# Patient Record
Sex: MALE | Race: WHITE | NOT HISPANIC OR LATINO | Employment: FULL TIME | URBAN - METROPOLITAN AREA
[De-identification: names, ages, dates, MRNs, and addresses within clinical notes are randomized per-mention and may not be internally consistent; named-entity substitution may affect disease eponyms.]

---

## 2017-01-31 ENCOUNTER — COMMUNICATION - HEALTHEAST (OUTPATIENT)
Dept: FAMILY MEDICINE | Facility: CLINIC | Age: 37
End: 2017-01-31

## 2017-02-07 ENCOUNTER — OFFICE VISIT - HEALTHEAST (OUTPATIENT)
Dept: INTERNAL MEDICINE | Facility: CLINIC | Age: 37
End: 2017-02-07

## 2017-02-07 DIAGNOSIS — R74.01 NONSPECIFIC ELEVATION OF LEVELS OF TRANSAMINASE OR LACTIC ACID DEHYDROGENASE (LDH): ICD-10-CM

## 2017-02-07 DIAGNOSIS — Z00.00 ROUTINE GENERAL MEDICAL EXAMINATION AT A HEALTH CARE FACILITY: ICD-10-CM

## 2017-02-07 DIAGNOSIS — R74.02 NONSPECIFIC ELEVATION OF LEVELS OF TRANSAMINASE OR LACTIC ACID DEHYDROGENASE (LDH): ICD-10-CM

## 2017-02-07 DIAGNOSIS — M10.9 GOUT, UNSPECIFIED: ICD-10-CM

## 2017-02-07 LAB
CHOLEST SERPL-MCNC: 169 MG/DL
FASTING STATUS PATIENT QL REPORTED: ABNORMAL
HDLC SERPL-MCNC: 42 MG/DL
LDLC SERPL CALC-MCNC: 85 MG/DL
TRIGL SERPL-MCNC: 208 MG/DL

## 2017-02-07 ASSESSMENT — MIFFLIN-ST. JEOR: SCORE: 2204.46

## 2017-02-13 ENCOUNTER — COMMUNICATION - HEALTHEAST (OUTPATIENT)
Dept: INTERNAL MEDICINE | Facility: CLINIC | Age: 37
End: 2017-02-13

## 2018-03-22 ENCOUNTER — COMMUNICATION - HEALTHEAST (OUTPATIENT)
Dept: INTERNAL MEDICINE | Facility: CLINIC | Age: 38
End: 2018-03-22

## 2018-03-22 DIAGNOSIS — Z30.09 VASECTOMY EVALUATION: ICD-10-CM

## 2018-05-31 ENCOUNTER — RECORDS - HEALTHEAST (OUTPATIENT)
Dept: ADMINISTRATIVE | Facility: OTHER | Age: 38
End: 2018-05-31

## 2018-07-03 ENCOUNTER — COMMUNICATION - HEALTHEAST (OUTPATIENT)
Dept: TELEHEALTH | Facility: CLINIC | Age: 38
End: 2018-07-03

## 2018-07-03 ENCOUNTER — HOSPITAL ENCOUNTER (OUTPATIENT)
Dept: ULTRASOUND IMAGING | Facility: CLINIC | Age: 38
Discharge: HOME | End: 2018-07-03
Attending: UROLOGY

## 2018-07-03 ENCOUNTER — RECORDS - HEALTHEAST (OUTPATIENT)
Dept: ADMINISTRATIVE | Facility: OTHER | Age: 38
End: 2018-07-03

## 2018-07-03 DIAGNOSIS — N45.3 ORCHITIS AND EPIDIDYMITIS: ICD-10-CM

## 2018-08-06 ENCOUNTER — COMMUNICATION - HEALTHEAST (OUTPATIENT)
Dept: INTERNAL MEDICINE | Facility: CLINIC | Age: 38
End: 2018-08-06

## 2018-08-06 ENCOUNTER — OFFICE VISIT - HEALTHEAST (OUTPATIENT)
Dept: INTERNAL MEDICINE | Facility: CLINIC | Age: 38
End: 2018-08-06

## 2018-08-06 ENCOUNTER — RECORDS - HEALTHEAST (OUTPATIENT)
Dept: GENERAL RADIOLOGY | Facility: CLINIC | Age: 38
End: 2018-08-06

## 2018-08-06 DIAGNOSIS — M25.472 EFFUSION, LEFT ANKLE: ICD-10-CM

## 2018-08-06 DIAGNOSIS — M25.472 LEFT ANKLE SWELLING: ICD-10-CM

## 2019-02-27 ENCOUNTER — COMMUNICATION - HEALTHEAST (OUTPATIENT)
Dept: ADMINISTRATIVE | Facility: CLINIC | Age: 39
End: 2019-02-27

## 2019-02-27 ENCOUNTER — OFFICE VISIT - HEALTHEAST (OUTPATIENT)
Dept: INTERNAL MEDICINE | Facility: CLINIC | Age: 39
End: 2019-02-27

## 2019-02-27 DIAGNOSIS — M10.9 ACUTE GOUT OF LEFT FOOT, UNSPECIFIED CAUSE: ICD-10-CM

## 2019-02-27 RX ORDER — LORAZEPAM 0.5 MG
2 TABLET ORAL EVERY MORNING
Status: SHIPPED | COMMUNITY
Start: 2019-02-27 | End: 2022-04-07

## 2019-06-11 ENCOUNTER — OFFICE VISIT - HEALTHEAST (OUTPATIENT)
Dept: INTERNAL MEDICINE | Facility: CLINIC | Age: 39
End: 2019-06-11

## 2019-06-11 DIAGNOSIS — Z00.00 ROUTINE GENERAL MEDICAL EXAMINATION AT A HEALTH CARE FACILITY: ICD-10-CM

## 2019-06-11 DIAGNOSIS — L91.8 CUTANEOUS SKIN TAGS: ICD-10-CM

## 2019-06-11 DIAGNOSIS — M10.9 ACUTE GOUT OF LEFT FOOT, UNSPECIFIED CAUSE: ICD-10-CM

## 2019-06-11 LAB
CHOLEST SERPL-MCNC: 173 MG/DL
FASTING STATUS PATIENT QL REPORTED: YES
FASTING STATUS PATIENT QL REPORTED: YES
GLUCOSE BLD-MCNC: 89 MG/DL (ref 70–125)
HDLC SERPL-MCNC: 48 MG/DL
LDLC SERPL CALC-MCNC: 97 MG/DL
TRIGL SERPL-MCNC: 139 MG/DL
URATE SERPL-MCNC: 8 MG/DL (ref 3–8)

## 2019-06-11 RX ORDER — INDOMETHACIN 50 MG/1
CAPSULE ORAL
Qty: 20 CAPSULE | Refills: 0 | Status: SHIPPED | OUTPATIENT
Start: 2019-06-11 | End: 2022-04-07

## 2019-06-11 ASSESSMENT — MIFFLIN-ST. JEOR: SCORE: 2262.29

## 2019-12-20 ENCOUNTER — RECORDS - HEALTHEAST (OUTPATIENT)
Dept: ADMINISTRATIVE | Facility: OTHER | Age: 39
End: 2019-12-20

## 2021-05-29 NOTE — PROGRESS NOTES
Assessment:      Healthy male exam.      Plan:      Skin tags were removed with ethyl chloride and an iris scissors.  Check a lipid profile, glucose, and uric acid level.  Follow-up 2 years, earlier if needed.    Subjective:      Marin Jimenez is a 38 y.o. male who presents for an annual exam. The patient reports that there is not domestic violence in his life.         Immunization History   Administered Date(s) Administered     Td, adult adsorbed, PF 08/09/2018     Immunization status: up to date and documented.    No exam data present    Current Outpatient Medications   Medication Sig Dispense Refill     indomethacin (INDOCIN) 50 MG capsule Take 3 times a day for 2 days, then twice a day for 3 days, then once a day for 4 days, then stop. 20 capsule 0     sour cherry extract (TART CHERRY EXTRACT) 1,000 mg cap Take 2 capsules by mouth every morning.       No current facility-administered medications for this visit.      History reviewed. No pertinent past medical history.  Past Surgical History:   Procedure Laterality Date     INGUINAL HERNIA REPAIR Left 2004     Patient has no known allergies.  History reviewed. No pertinent family history.  Social History     Socioeconomic History     Marital status:      Spouse name: Not on file     Number of children: Not on file     Years of education: Not on file     Highest education level: Not on file   Occupational History     Not on file   Social Needs     Financial resource strain: Not on file     Food insecurity:     Worry: Not on file     Inability: Not on file     Transportation needs:     Medical: Not on file     Non-medical: Not on file   Tobacco Use     Smoking status: Never Smoker     Smokeless tobacco: Never Used   Substance and Sexual Activity     Alcohol use: Not on file     Drug use: Not on file     Sexual activity: Not on file   Lifestyle     Physical activity:     Days per week: Not on file     Minutes per session: Not on file     Stress: Not on file  "  Relationships     Social connections:     Talks on phone: Not on file     Gets together: Not on file     Attends Rastafari service: Not on file     Active member of club or organization: Not on file     Attends meetings of clubs or organizations: Not on file     Relationship status: Not on file     Intimate partner violence:     Fear of current or ex partner: Not on file     Emotionally abused: Not on file     Physically abused: Not on file     Forced sexual activity: Not on file   Other Topics Concern     Not on file   Social History Narrative     Not on file       Review of Systems  General:  Denies problem  Eyes: Denies problem  Ears/Nose/Throat: Denies problem  Cardiovascular: Denies problem  Respiratory:  Denies problem  Gastrointestinal:  Denies problem  Genitourinary: Denies problem  Musculoskeletal:  Denies problem  Skin: Denies problem  Neurologic: Denies problem  Psychiatric: Denies problem  Endocrine: Denies problem  Heme/Lymphatic: Denies problem   Allergic/Immunologic: Denies problem        Objective:     Vitals:    06/11/19 0955   BP: 124/84   Pulse: 64   Weight: (!) 267 lb (121.1 kg)   Height: 6' 6.5\" (1.994 m)     Body mass index is 30.46 kg/m .    Physical  General Appearance: Alert, cooperative, no distress, appears stated age  Head: Normocephalic, without obvious abnormality, atraumatic  Eyes: PERRL, conjunctiva/corneas clear, EOM's intact  Ears: Normal TM's and external ear canals, both ears  Nose: Nares normal, septum midline,mucosa normal, no drainage  Throat: Lips, mucosa, and tongue normal; teeth and gums normal  Neck: Supple, symmetrical, trachea midline, no adenopathy;  thyroid: not enlarged, symmetric, no tenderness/mass/nodules; no carotid bruit or JVD  Back: Symmetric, no curvature, ROM normal, no CVA tenderness  Lungs: Clear to auscultation bilaterally, respirations unlabored  Heart: Regular rate and rhythm, S1 and S2 normal, no murmur, rub, or gallop,  Abdomen: Soft, non-tender, " bowel sounds active all four quadrants,  no masses, no organomegaly  Musculoskeletal: Normal range of motion. No joint swelling or deformity.   Extremities: Extremities normal, atraumatic, no cyanosis or edema  Skin: Skin color, texture, turgor normal, no rashes or lesions  Lymph nodes: Cervical, supraclavicular, and axillary nodes normal  Neurologic: He is alert. He has normal reflexes.   Psychiatric: He has a normal mood and affect.   Skin: There is 3 skin tags in the right axilla and one skin tag in the left axilla.

## 2021-05-30 VITALS — WEIGHT: 259.5 LBS | BODY MASS INDEX: 30.64 KG/M2 | HEIGHT: 77 IN

## 2021-06-01 VITALS — WEIGHT: 282 LBS | BODY MASS INDEX: 33.44 KG/M2

## 2021-06-02 VITALS — BODY MASS INDEX: 32.37 KG/M2 | WEIGHT: 273 LBS

## 2021-06-03 VITALS — BODY MASS INDEX: 30.89 KG/M2 | HEIGHT: 78 IN | WEIGHT: 267 LBS

## 2021-06-08 NOTE — PROGRESS NOTES
Marin presents today to establish care as a new patient.  Please see the physical exam forms a and B for further details, including a complete review of systems.    ILNESSES, HOSPITALIZATIONS, AND OPERATIONS:    #1.  Clinically diagnosed gout in 2014.  He also has hyperuricemia as well.    #2.  Status post left inguinal hernia repair in 2004    Marin feels well currently and has no acute complaints.  It has been over a year since his last flare of gout.  He had a very minimally elevated ALT on his last lab draw in 2014.  He has lost about 20 pounds over the last year which I congratulated him on.  He is not on any prescription medications.  No known drug allergies.  No family history of prostate cancer or colon cancer.  He works as an , is  and has 2 children with one on the way.  He lives in [place].  He is a nonsmoker.  A complete review of systems was unremarkable.    OBJECTIVE:    In general the patient is alert, pleasant, and no acute distress.    HEENT: Pupils are equal, round, and reactive to light.  Oropharynx is clear  LYMPHATIC: No anterior or posterior lymphadenopathy  CV: S1, S2, regular rate and rhythm, no murmurs, gallops, or rubs  LUNGS: Clear to auscultation bilaterally  ABDOMEN: Soft, non-tender, non-distended  EXTREMITIES: No pedal edema bilaterally    Assessment and plan: Physical exam with the following:    #1.  Gout.  Check a uric acid level.    #2.  History of mildly elevated ALT in the past.  Check a CMP today.    #3.  Healthcare maintenance.:  Prostate cancer screening will not be due until age 50.  Check a lipid profile today.  Glucose will be based off his comp.

## 2021-06-19 NOTE — PROGRESS NOTES
Marin comes in today for evaluation of left ankle swelling.  He states that this is been present for about the last 6 months.  It had been worse, but has now improved over the last couple of months.  Fortunately he has absolutely no pain whatsoever associated with this.  He has no pain with weightbearing and no pain with ambulation.  He denies any erythema around the joint and has no other involved joints at all.  A complete review of systems is undertaken was negative.  Specifically, he is having no fevers, chest pain, shortness of breath.  No other sexual partners outside of his marriage.    Objective: Vital signs are as per the EMR.  In general the patient is alert pleasant and in no acute distress.  He appears healthy.    Left ankle.  There is a mild to moderate amount of swelling around the tibiotalar joint.  No tenderness to palpation.  Ankle range of motion is slightly limited to plantar flexion, dorsiflexion, inversion and eversion.  No erythema and no warmth to touch.    Imaging: 3 views of the left ankle were obtained today.  I see no bony abnormalities.    Assessment and plan: Left ankle swelling ×6 months.  Interestingly he is having absolutely no pain whatsoever.  I told him that the next step in workup of this would be taking a better look at the structure of his ankle with an MRI.  However, again since he is having no pain and is not significantly interfering with the quality of his life we could consider keeping eye on things for the time being.  He is going to call his insurance company to see how much an MRI would be and get back to me regarding his decision.

## 2021-06-24 NOTE — PATIENT INSTRUCTIONS - HE
Stay well-hydrated with plenty of water.  You can continue the cherry extract.    Avoid all alcohol.  Eat a low purine diet, or gout diet, which you can look up on the Internet.    You can use ice to reduce throbbing pain.    Use indomethacin to reduce pain and inflammation.  Contact clinic if the pain is not getting better within a couple of days, or recurs.  You could consider a course of steroids.    Follow-up this spring or summer with Dr. Ellis Knott for a physical exam.

## 2021-06-24 NOTE — TELEPHONE ENCOUNTER
Pt was seen by Dr Estevez this morning and was rx'd indomethacin. Pt notified that if the gout flares become more common and he wants to start on longterm gout medication to call us back to schedule follow up with Dr Strange

## 2021-06-24 NOTE — TELEPHONE ENCOUNTER
Alexadnr    Last seen 04/2014. Currently having a gout flare up on left toe. Has not had a flare up since then so that is why his he has not returned. Wondering if he can get in today for a visit or if an RX can be sent in.    Notified that since it has been over 3 years, he would be considered a new patient. He can also ask his PCP. For now, It will be up to the provider.     Patient can be reached @ [phone].

## 2021-06-24 NOTE — PROGRESS NOTES
[facility] clinic Follow Up Note    Marin Jimenez   38 y.o. male    Date of Visit: 2/27/2019    Chief Complaint   Patient presents with     Gout     Subjective  Marin is a Ellis Knott patient who is coming in for acute gout in his left MP joint 3 days ago.    Patient does have a past history of gout but last episode was 3 years ago.  Originally diagnosed in 2014.    2014 he did have a high uric acid level of 9.1.  2017 uric acid level is normal with a creatinine 1.1 and normal liver tests and blood sugar.    Patient was down in [place] one week ago and was overeating and drinking alcohol.    This past weekend patient had more beer than usual.    Sunday, 3 days ago, patient had acute redness and swelling and pain in his right MT joint.  No other joints affected.    No fevers.  Similar to previous gout.  He is staying well-hydrated, abstaining from alcohol and using some cherry juice extract.    He did take 3 ibuprofen this morning which helped a little bit.    He denies a past history of ulcer or bleeding issues.    His blood pressure has been normal.    He is  with 2 children.  Otherwise healthy.    PMHx:  No past medical history on file.  PSHx:    Past Surgical History:   Procedure Laterality Date     INGUINAL HERNIA REPAIR Left 2004     Immunizations:   Immunization History   Administered Date(s) Administered     Td, adult adsorbed, PF 08/09/2018       ROS A comprehensive review of systems was performed and was otherwise negative    Medications, allergies, and problem list were reviewed and updated    Exam  /84 (Patient Site: Right Arm, Patient Position: Sitting, Cuff Size: Adult Large)   Pulse 60   Temp 97.9  F (36.6  C) (Oral)   Resp 16   Wt (!) 273 lb (123.8 kg)   BMI 32.37 kg/m    Heart is regular without murmur.  No jaundice.  Otherwise appears well.  Abdomen is nontender, mildly overweight.    Left MP joint with significant erythema and swelling and tenderness to touch.  No  tenderness at the ankle or navicular bone or fifth metatarsal.  Skin is intact.  No ankle edema.    Assessment/Plan  1. Acute gout of left foot, unspecified cause  Symptoms consistent with acute gout attack.  Past history of gout.  Unclear precipitating factor of increased alcohol intake prior to event.    I discussed low purine diet, and abstaining from alcohol.    Stay well-hydrated and can continue the cherry juice extract.    I did discuss risk of indomethacin including stomach ulcer, bleeding, kidney injury and blood pressure effects.  He accepts these risks and does wish to proceed with treatment.    I also discussed prednisone treatment, but also risks with prednisone.    I would plan a Medrol Dosepak if patient does not have an adequate response to the Indocin or significant recurrence.    Patient was told not to take other NSAIDs with the Indocin  - indomethacin (INDOCIN) 50 MG capsule; Take 3 times a day for 2 days, then twice a day for 3 days, then once a day for 4 days, then stop.  Dispense: 20 capsule; Refill: 0    Return in about 4 months (around 6/27/2019) for Annual physical with Dr. Ellis Knott.   Patient Instructions   Stay well-hydrated with plenty of water.  You can continue the cherry extract.    Avoid all alcohol.  Eat a low purine diet, or gout diet, which you can look up on the Internet.    You can use ice to reduce throbbing pain.    Use indomethacin to reduce pain and inflammation.  Contact clinic if the pain is not getting better within a couple of days, or recurs.  You could consider a course of steroids.    Follow-up this spring or summer with Dr. Ellis Knott for a physical exam.    Raul Estevez MD        Current Outpatient Medications   Medication Sig Dispense Refill     sour cherry extract (TART CHERRY EXTRACT) 1,000 mg cap Take 2 capsules by mouth every morning.       indomethacin (INDOCIN) 50 MG capsule Take 3 times a day for 2 days, then twice a day for 3 days, then once a day  for 4 days, then stop. 20 capsule 0     No current facility-administered medications for this visit.      No Known Allergies  Social History     Tobacco Use     Smoking status: Never Smoker     Smokeless tobacco: Never Used   Substance Use Topics     Alcohol use: Not on file     Drug use: Not on file         Marin is a patient of Dr. Ellis Knott who is coming in for acute gout of his left

## 2021-06-26 ENCOUNTER — HEALTH MAINTENANCE LETTER (OUTPATIENT)
Age: 41
End: 2021-06-26

## 2021-08-20 ENCOUNTER — OFFICE VISIT (OUTPATIENT)
Dept: FAMILY MEDICINE | Facility: CLINIC | Age: 41
End: 2021-08-20
Payer: COMMERCIAL

## 2021-08-20 VITALS
TEMPERATURE: 98.6 F | HEART RATE: 87 BPM | SYSTOLIC BLOOD PRESSURE: 128 MMHG | DIASTOLIC BLOOD PRESSURE: 78 MMHG | WEIGHT: 281 LBS | RESPIRATION RATE: 15 BRPM | BODY MASS INDEX: 32.06 KG/M2 | OXYGEN SATURATION: 97 %

## 2021-08-20 DIAGNOSIS — R05.9 COUGH: Primary | ICD-10-CM

## 2021-08-20 DIAGNOSIS — R09.81 CONGESTION OF PARANASAL SINUS: ICD-10-CM

## 2021-08-20 PROCEDURE — U0005 INFEC AGEN DETEC AMPLI PROBE: HCPCS | Performed by: PHYSICIAN ASSISTANT

## 2021-08-20 PROCEDURE — 99213 OFFICE O/P EST LOW 20 MIN: CPT | Performed by: PHYSICIAN ASSISTANT

## 2021-08-20 PROCEDURE — U0003 INFECTIOUS AGENT DETECTION BY NUCLEIC ACID (DNA OR RNA); SEVERE ACUTE RESPIRATORY SYNDROME CORONAVIRUS 2 (SARS-COV-2) (CORONAVIRUS DISEASE [COVID-19]), AMPLIFIED PROBE TECHNIQUE, MAKING USE OF HIGH THROUGHPUT TECHNOLOGIES AS DESCRIBED BY CMS-2020-01-R: HCPCS | Performed by: PHYSICIAN ASSISTANT

## 2021-08-20 RX ORDER — FLUTICASONE PROPIONATE 50 MCG
1 SPRAY, SUSPENSION (ML) NASAL DAILY
Qty: 9.9 ML | Refills: 0 | Status: SHIPPED | OUTPATIENT
Start: 2021-08-20 | End: 2022-04-07

## 2021-08-20 RX ORDER — GUAIFENESIN 600 MG/1
1200 TABLET, EXTENDED RELEASE ORAL 2 TIMES DAILY
Qty: 30 TABLET | Refills: 0 | Status: SHIPPED | OUTPATIENT
Start: 2021-08-20 | End: 2022-04-07

## 2021-08-20 RX ORDER — OMEGA-3 FATTY ACIDS/FISH OIL 300-1000MG
200 CAPSULE ORAL EVERY 4 HOURS PRN
COMMUNITY
End: 2022-04-07

## 2021-08-20 ASSESSMENT — ENCOUNTER SYMPTOMS
FATIGUE: 1
CHILLS: 1
NAUSEA: 0
SINUS PAIN: 1
FEVER: 0
HEADACHES: 1
SORE THROAT: 0
ABDOMINAL PAIN: 0
SHORTNESS OF BREATH: 0
VOMITING: 0
WHEEZING: 0
DIARRHEA: 0
COUGH: 1
SINUS PRESSURE: 1
RHINORRHEA: 1

## 2021-08-20 NOTE — PATIENT INSTRUCTIONS
1. Check your Mychart in 24 hours for your COVID test result.   2. Begin taking Flonase, saline nasal sprays can also be helpful. Continue with your oral antihistamine.   3. Begin taking Mucinex twice daily. Drink plenty of water for this medicine to work.   4. If no improvement by Monday go ahead and fill the Rx for Augmentin and take with food for 10 days. If your COVID test is positive I don't recommend taking Augmentin, as it is not likely to be helpful.

## 2021-08-20 NOTE — PROGRESS NOTES
Patient presents with:  Nasal Congestion  Cough  Generalized Body Aches  Fatigue  Headache  sick x 5 days      Clinical Decision Making: Physical exam is benign.  Covid test is pending.  Recommend continued antihistamine, ibuprofen, and start Flonase and saline nasal wash and Mucinex.  Patient was given a postdated prescription for an antibiotic in case his symptoms fail to improve over the next 3 days.      ICD-10-CM    1. Cough  R05 Symptomatic COVID-19 Virus (Coronavirus) by PCR Nasopharyngeal   2. Congestion of paranasal sinus  R09.81 fluticasone (FLONASE) 50 MCG/ACT nasal spray     amoxicillin-clavulanate (AUGMENTIN) 875-125 MG tablet     guaiFENesin (MUCINEX) 600 MG 12 hr tablet       Patient Instructions   1. Check your Mychart in 24 hours for your COVID test result.   2. Begin taking Flonase, saline nasal sprays can also be helpful. Continue with your oral antihistamine.   3. Begin taking Mucinex twice daily. Drink plenty of water for this medicine to work.   4. If no improvement by Monday go ahead and fill the Rx for Augmentin and take with food for 10 days. If your COVID test is positive I don't recommend taking Augmentin, as it is not likely to be helpful.       HPI:  Marin Jimenez is a 41 year old male who presents today complaining of sick sxs x 5 days. Patient has been experiencing HA, nasal congestion, body aches, fatigue, and cough. Ousmane is not vaccinated against COVID. Patient had a positive antibody test last June. [facility]. Ousmane has been taking Ibuprofen for his symptoms. Ousmane also takes Claritin and/or Zyrtec; he switches between the two meds, but he always takes one per day.     History obtained from the patient.    Problem List:  Gout  Obesity  Serum Enzyme Levels - ALT (SGPT) Elevated      No past medical history on file.    Social History     Tobacco Use     Smoking status: Never Smoker     Smokeless tobacco: Never Used   Substance Use Topics     Alcohol use: Not on file       Review  of Systems   Constitutional: Positive for chills and fatigue. Negative for fever.        (+) body aches   HENT: Positive for congestion, rhinorrhea, sinus pressure and sinus pain. Negative for ear pain and sore throat.    Respiratory: Positive for cough. Negative for shortness of breath and wheezing.    Gastrointestinal: Negative for abdominal pain, diarrhea, nausea and vomiting.   Skin: Negative for rash.   Allergic/Immunologic: Positive for environmental allergies (bad in August).   Neurological: Positive for headaches.       Vitals:    08/20/21 1019 08/20/21 1022   BP: (!) 147/79 128/78   Pulse: 87    Resp: 15    Temp:  98.6  F (37  C)   SpO2: 97%    Weight: 127.5 kg (281 lb)        Physical Exam  Vitals and nursing note reviewed.   Constitutional:       General: He is not in acute distress.     Appearance: He is not toxic-appearing or diaphoretic.   HENT:      Head: Normocephalic and atraumatic.      Right Ear: Tympanic membrane, ear canal and external ear normal.      Left Ear: Tympanic membrane, ear canal and external ear normal.      Nose: Congestion present. No rhinorrhea.      Right Sinus: No maxillary sinus tenderness or frontal sinus tenderness.      Left Sinus: No maxillary sinus tenderness or frontal sinus tenderness.      Mouth/Throat:      Mouth: Mucous membranes are moist.      Pharynx: No oropharyngeal exudate or posterior oropharyngeal erythema.   Eyes:      Conjunctiva/sclera: Conjunctivae normal.   Cardiovascular:      Rate and Rhythm: Normal rate and regular rhythm.      Heart sounds: No murmur heard.     Pulmonary:      Effort: Pulmonary effort is normal. No respiratory distress.      Breath sounds: No wheezing, rhonchi or rales.   Neurological:      Mental Status: He is alert.   Psychiatric:         Mood and Affect: Mood normal.         Behavior: Behavior normal.         Thought Content: Thought content normal.         Judgment: Judgment normal.         At the end of the encounter, I discussed  results, diagnosis, medications. Discussed red flags for immediate return to clinic/ER, as well as indications for follow up if no improvement. Patient understood and agreed to plan. Patient was stable for discharge.

## 2021-08-21 LAB — SARS-COV-2 RNA RESP QL NAA+PROBE: NEGATIVE

## 2021-10-16 ENCOUNTER — HEALTH MAINTENANCE LETTER (OUTPATIENT)
Age: 41
End: 2021-10-16

## 2022-01-05 ENCOUNTER — E-VISIT (OUTPATIENT)
Dept: URGENT CARE | Facility: CLINIC | Age: 42
End: 2022-01-05
Payer: COMMERCIAL

## 2022-01-05 DIAGNOSIS — Z20.822 SUSPECTED COVID-19 VIRUS INFECTION: Primary | ICD-10-CM

## 2022-01-05 PROCEDURE — 99421 OL DIG E/M SVC 5-10 MIN: CPT | Performed by: NURSE PRACTITIONER

## 2022-01-06 ENCOUNTER — LAB (OUTPATIENT)
Dept: URGENT CARE | Facility: URGENT CARE | Age: 42
End: 2022-01-06
Attending: NURSE PRACTITIONER
Payer: COMMERCIAL

## 2022-01-06 DIAGNOSIS — Z20.822 SUSPECTED COVID-19 VIRUS INFECTION: ICD-10-CM

## 2022-01-06 LAB — SARS-COV-2 RNA RESP QL NAA+PROBE: POSITIVE

## 2022-01-06 PROCEDURE — U0003 INFECTIOUS AGENT DETECTION BY NUCLEIC ACID (DNA OR RNA); SEVERE ACUTE RESPIRATORY SYNDROME CORONAVIRUS 2 (SARS-COV-2) (CORONAVIRUS DISEASE [COVID-19]), AMPLIFIED PROBE TECHNIQUE, MAKING USE OF HIGH THROUGHPUT TECHNOLOGIES AS DESCRIBED BY CMS-2020-01-R: HCPCS

## 2022-01-06 PROCEDURE — U0005 INFEC AGEN DETEC AMPLI PROBE: HCPCS

## 2022-01-06 NOTE — PATIENT INSTRUCTIONS
Marin,      Based on your responses, you may have coronavirus (COVID-19). This illness can cause fever, cough and trouble breathing. Many people get a mild case and get better on their own. Some people can get very sick.    Will I be tested for COVID-19?  We would like to test you for COVID-19 virus. I have placed orders for this test.     To schedule: go to your [employer] home page and scroll down to the section that says  You have an appointment that needs to be scheduled  and click the large green button that says  Schedule Now  and follow the steps to find the next available openings.    If you are unable to complete these [employer] scheduling steps, please call [phone] to schedule your testing.     Return to work/school/ guidance:  Please let your workplace manager and staffing office know when your isolation ends.       If you receive a positive COVID-19 test result, follow the guidance of the those who are giving you the results. Usually the return to work is 10 days from symptom onset or positive test date, (or in some cases 20 days if you are immunocompromised). If your symptoms started after your positive test, the 10 days should start when your symptoms started.   o If you work at [employer], you must also be cleared by Employee Occupational Health and Safety to return to work.      If you receive a negative COVID-19 test result and did not have a high risk exposure to someone with a known positive COVID-19 test, you can return to work once you're free of fever for 24 hours without fever-reducing medication and your symptoms are improving or resolved.    If you receive a negative COVID-19 test and had a high-risk exposure to someone who has tested positive for COVID-19 then you can return to work 14 days after your last contact with the positive individual. Follow quarantine guidance given by your doctor or public health officials.     Sign up for Get[employer] Loop:  We know it's scary to  hear that you might have COVID-19. We want to track your symptoms to make sure you're okay over the next 2 weeks. Please look for an email from [employer]--this is a free, online program that we'll use to keep in touch. To sign up, follow the link in the email you will receive. Learn more at [email or web address]    How can I take care of myself?  Over the counter medications may help with your symptoms like congestion, cough, chills, or fever.    There are not many effective prescription treatments for early COVID-19. Hydroxychloroquine, ivermectin, and azithromycin are not effective or recommended for COVID-19.    If your symptoms started in the last 10 days, you may be able to receive a treatment with monoclonal antibodies. This treatment can lower your risk of severe illness and going to the hospital. It is given through an IV or under your skin (subcutaneous) and must be given at an infusion center. You must be 12 or older, weight at least 88 pounds, and have a positive COVID-19 test.     If you would like to sign up to be considered to receive the monoclonal antibody medicine, please complete a participation form through the [facility] here: MNRAP ([email or web address]). You may also call the [facility] COVID-19 Public Hotline at 1-112.722.5442 (open Mon-Fri: 9am-7pm and Sat: 10am-6pm).     Not all people who are eligible will receive the medicine, since supply is limited. You will be contacted in the next 1 to 2 business days only if you are selected. If you do not receive a call, you have not been selected to receive the medicine. If you have any questions about this medication, please contact your primary care provider. For more information, see [email or web address]      Get lots of rest. Drink extra fluids (unless a doctor has told you not to)    Take Tylenol (acetaminophen) or ibuprofen for fever  or pain. If you have liver or kidney problems, ask your family doctor if it's okay to take Tylenol o ibuprofen    Take over the counter medications for your symptoms, as directed by your doctor. You may also talk to your pharmacist.      If you have other health problems (like cancer, heart failure, an organ transplant or severe kidney disease): Call your specialty clinic if you don't feel better in the next 2 days.    Know when to call 911. Emergency warning signs include:  o Trouble breathing or shortness of breath  o Pain or pressure in the chest that doesn't go away  o Feeling confused like you haven't felt before, or not being able to wake up  o Bluish-colored lips or face    Where can I get more information?    [facility] - About COVID-19: [email or web address]     CDC - What to Do If You're Sick:     www.cdc.gov/coronavirus/2019-ncov/about/steps-when-sick.html    CDC - Ending Home Isolation:  https://www.cdc.gov/coronavirus/2019-ncov/your-health/quarantine-isolation.html    CDC - Caring for Someone:  www.cdc.gov/coronavirus/2019-ncov/if-you-are-sick/care-for-someone.html    [facility] clinical trials (COVID-19 research studies): clinicalaffairs.[facility]/n-clinical-trials    Below are the COVID-19 hotlines at the [facility] of Health ([facility]). Interpreters are available.  o For health questions: Call 485-623-1814 or 1-545.381.7002 (7 a.m. to 7 p.m.)  o For questions about schools and childcare: Call 842-366-0622 or 1-971.841.3811 (7 a.m. to 7 p.m.)

## 2022-01-10 ENCOUNTER — MYC MEDICAL ADVICE (OUTPATIENT)
Dept: INTERNAL MEDICINE | Facility: CLINIC | Age: 42
End: 2022-01-10
Payer: COMMERCIAL

## 2022-04-06 ENCOUNTER — TELEPHONE (OUTPATIENT)
Dept: RHEUMATOLOGY | Facility: CLINIC | Age: 42
End: 2022-04-06
Payer: COMMERCIAL

## 2022-04-06 DIAGNOSIS — M10.9 GOUT, UNSPECIFIED CAUSE, UNSPECIFIED CHRONICITY, UNSPECIFIED SITE: Primary | ICD-10-CM

## 2022-04-06 NOTE — TELEPHONE ENCOUNTER
Patient experiencing a painful gout flare in left toe. Requesting medication to treat. Please c/b to advise on next steps.

## 2022-04-07 RX ORDER — PREDNISONE 20 MG/1
60 TABLET ORAL DAILY
Qty: 15 TABLET | Refills: 0 | Status: SHIPPED | OUTPATIENT
Start: 2022-04-07 | End: 2022-04-12

## 2022-07-23 ENCOUNTER — HEALTH MAINTENANCE LETTER (OUTPATIENT)
Age: 42
End: 2022-07-23

## 2022-10-01 ENCOUNTER — HEALTH MAINTENANCE LETTER (OUTPATIENT)
Age: 42
End: 2022-10-01

## 2023-08-06 ENCOUNTER — HEALTH MAINTENANCE LETTER (OUTPATIENT)
Age: 43
End: 2023-08-06

## 2024-07-22 ENCOUNTER — OFFICE VISIT (OUTPATIENT)
Dept: FAMILY MEDICINE | Facility: CLINIC | Age: 44
End: 2024-07-22
Payer: COMMERCIAL

## 2024-07-22 VITALS
WEIGHT: 281.8 LBS | OXYGEN SATURATION: 98 % | DIASTOLIC BLOOD PRESSURE: 80 MMHG | TEMPERATURE: 98.3 F | BODY MASS INDEX: 32.15 KG/M2 | HEART RATE: 75 BPM | RESPIRATION RATE: 16 BRPM | SYSTOLIC BLOOD PRESSURE: 128 MMHG

## 2024-07-22 DIAGNOSIS — L03.116 CELLULITIS OF LEFT LOWER EXTREMITY: Primary | ICD-10-CM

## 2024-07-22 PROCEDURE — 99213 OFFICE O/P EST LOW 20 MIN: CPT | Performed by: PHYSICIAN ASSISTANT

## 2024-07-22 RX ORDER — CEPHALEXIN 500 MG/1
500 CAPSULE ORAL 4 TIMES DAILY
Qty: 40 CAPSULE | Refills: 0 | Status: SHIPPED | OUTPATIENT
Start: 2024-07-22 | End: 2024-08-01

## 2024-07-22 NOTE — PROGRESS NOTES
Chief Complaint   Patient presents with    Musculoskeletal Problem     Symptoms started on Saturday. Swollen and red. Possible insect bite in the area.        ASSESSMENT/PLAN:  Marin was seen today for musculoskeletal problem.    Diagnoses and all orders for this visit:    Cellulitis of left lower extremity  -     cephALEXin (KEFLEX) 500 MG capsule; Take 1 capsule (500 mg) by mouth 4 times daily for 10 days    Considered DVT since [place] but this seems less likely  Start Keflex right away for 7 to 10 days.  Return in 2 to 3 days if not improving    Kirby Pruett PA-C      SUBJECTIVE:  Marin is a 44 year old male who presents to urgent care with 2 days of redness, swelling and some pain in the left lower extremity. it got much worse after walking last night.  He did go to the cabin this weekend.  Has a few bug bites.  He has been walking more over the last month and developed a callus that has split on his heel.  No history of DVT, no injuries    ROS: Pertinent ROS neg other than the symptoms noted above in the HPI.     OBJECTIVE:  /80   Pulse 75   Temp 98.3  F (36.8  C) (Oral)   Resp 16   Wt 127.8 kg (281 lb 12.8 oz)   SpO2 98%   BMI 32.15 kg/m     GENERAL: alert and no distress  MS: no gross musculoskeletal defects noted, no calf tenderness, Homans negative  SKIN: Erythema over the distal half of the left shin that wraps around to the backside and the ankle, bug bites noted, warm, swelling  NEURO: Normal strength and tone, mentation intact and speech normal    DIAGNOSTICS    No results found for any visits on 07/22/24.     No current outpatient medications on file.     No current facility-administered medications for this visit.      Patient Active Problem List   Diagnosis    Gout    Obesity    Serum Enzyme Levels - ALT (SGPT) Elevated      No past medical history on file.  Past Surgical History:   Procedure Laterality Date    INGUINAL HERNIA REPAIR Left 2004     No family  history on file.  Social History     Tobacco Use    Smoking status: Never    Smokeless tobacco: Never   Substance Use Topics    Alcohol use: Not on file              The plan of care was discussed with the patient. They understand and agree with the course of treatment prescribed. A printed summary was given including instructions and medications.  The use of Dragon/Rapid Vocabulary dictation services may have been used to construct the content in this note; any grammatical or spelling errors are non-intentional. Please contact the author of this note directly if you are in need of any clarification.

## 2024-09-29 ENCOUNTER — HEALTH MAINTENANCE LETTER (OUTPATIENT)
Age: 44
End: 2024-09-29

## 2024-10-28 ENCOUNTER — APPOINTMENT (OUTPATIENT)
Dept: CT IMAGING | Facility: CLINIC | Age: 44
End: 2024-10-28
Attending: EMERGENCY MEDICINE
Payer: COMMERCIAL

## 2024-10-28 ENCOUNTER — HOSPITAL ENCOUNTER (EMERGENCY)
Facility: CLINIC | Age: 44
Discharge: HOME | End: 2024-10-28
Attending: EMERGENCY MEDICINE | Admitting: EMERGENCY MEDICINE
Payer: COMMERCIAL

## 2024-10-28 VITALS
RESPIRATION RATE: 18 BRPM | TEMPERATURE: 97.4 F | DIASTOLIC BLOOD PRESSURE: 86 MMHG | HEART RATE: 63 BPM | SYSTOLIC BLOOD PRESSURE: 140 MMHG | OXYGEN SATURATION: 98 % | WEIGHT: 280 LBS | HEIGHT: 77 IN | BODY MASS INDEX: 33.06 KG/M2

## 2024-10-28 DIAGNOSIS — N20.1 URETEROLITHIASIS: ICD-10-CM

## 2024-10-28 LAB
ALBUMIN UR-MCNC: 30 MG/DL
ANION GAP SERPL CALCULATED.3IONS-SCNC: 13 MMOL/L (ref 7–15)
APPEARANCE UR: CLEAR
BACTERIA #/AREA URNS HPF: ABNORMAL /HPF
BASOPHILS # BLD AUTO: 0 10E3/UL (ref 0–0.2)
BASOPHILS NFR BLD AUTO: 1 %
BILIRUB UR QL STRIP: NEGATIVE
BUN SERPL-MCNC: 16.1 MG/DL (ref 6–20)
CALCIUM SERPL-MCNC: 8.4 MG/DL (ref 8.8–10.4)
CHLORIDE SERPL-SCNC: 104 MMOL/L (ref 98–107)
COLOR UR AUTO: YELLOW
CREAT SERPL-MCNC: 1.17 MG/DL (ref 0.67–1.17)
EGFRCR SERPLBLD CKD-EPI 2021: 79 ML/MIN/1.73M2
EOSINOPHIL # BLD AUTO: 0.1 10E3/UL (ref 0–0.7)
EOSINOPHIL NFR BLD AUTO: 1 %
ERYTHROCYTE [DISTWIDTH] IN BLOOD BY AUTOMATED COUNT: 12.7 % (ref 10–15)
GLUCOSE SERPL-MCNC: 125 MG/DL (ref 70–99)
GLUCOSE UR STRIP-MCNC: NEGATIVE MG/DL
HCO3 SERPL-SCNC: 23 MMOL/L (ref 22–29)
HCT VFR BLD AUTO: 43.7 % (ref 40–53)
HGB BLD-MCNC: 15.7 G/DL (ref 13.3–17.7)
HGB UR QL STRIP: ABNORMAL
IMM GRANULOCYTES # BLD: 0 10E3/UL
IMM GRANULOCYTES NFR BLD: 0 %
KETONES UR STRIP-MCNC: NEGATIVE MG/DL
LEUKOCYTE ESTERASE UR QL STRIP: NEGATIVE
LYMPHOCYTES # BLD AUTO: 2.6 10E3/UL (ref 0.8–5.3)
LYMPHOCYTES NFR BLD AUTO: 40 %
MCH RBC QN AUTO: 29.6 PG (ref 26.5–33)
MCHC RBC AUTO-ENTMCNC: 35.9 G/DL (ref 31.5–36.5)
MCV RBC AUTO: 82 FL (ref 78–100)
MONOCYTES # BLD AUTO: 0.5 10E3/UL (ref 0–1.3)
MONOCYTES NFR BLD AUTO: 8 %
MUCOUS THREADS #/AREA URNS LPF: PRESENT /LPF
NEUTROPHILS # BLD AUTO: 3.3 10E3/UL (ref 1.6–8.3)
NEUTROPHILS NFR BLD AUTO: 50 %
NITRATE UR QL: NEGATIVE
NRBC # BLD AUTO: 0 10E3/UL
NRBC BLD AUTO-RTO: 0 /100
PH UR STRIP: 6 [PH] (ref 5–7)
PLATELET # BLD AUTO: 180 10E3/UL (ref 150–450)
POTASSIUM SERPL-SCNC: 3.6 MMOL/L (ref 3.4–5.3)
RBC # BLD AUTO: 5.31 10E6/UL (ref 4.4–5.9)
RBC URINE: 174 /HPF
SODIUM SERPL-SCNC: 140 MMOL/L (ref 135–145)
SP GR UR STRIP: 1.02 (ref 1–1.03)
UROBILINOGEN UR STRIP-MCNC: <2 MG/DL
WBC # BLD AUTO: 6.6 10E3/UL (ref 4–11)
WBC URINE: 3 /HPF

## 2024-10-28 PROCEDURE — 99284 EMERGENCY DEPT VISIT MOD MDM: CPT | Mod: 25

## 2024-10-28 PROCEDURE — 81001 URINALYSIS AUTO W/SCOPE: CPT | Performed by: EMERGENCY MEDICINE

## 2024-10-28 PROCEDURE — 74176 CT ABD & PELVIS W/O CONTRAST: CPT

## 2024-10-28 PROCEDURE — 36415 COLL VENOUS BLD VENIPUNCTURE: CPT | Performed by: EMERGENCY MEDICINE

## 2024-10-28 PROCEDURE — 85004 AUTOMATED DIFF WBC COUNT: CPT | Performed by: EMERGENCY MEDICINE

## 2024-10-28 PROCEDURE — 80048 BASIC METABOLIC PNL TOTAL CA: CPT | Performed by: EMERGENCY MEDICINE

## 2024-10-28 RX ORDER — KETOROLAC TROMETHAMINE 15 MG/ML
15 INJECTION, SOLUTION INTRAMUSCULAR; INTRAVENOUS ONCE
Status: COMPLETED | OUTPATIENT
Start: 2024-10-28 | End: 2024-10-28

## 2024-10-28 RX ORDER — DIMENHYDRINATE 50 MG
50 TABLET ORAL AT BEDTIME
Qty: 7 TABLET | Refills: 0 | Status: SHIPPED | OUTPATIENT
Start: 2024-10-28 | End: 2024-11-04

## 2024-10-28 RX ORDER — IBUPROFEN 200 MG
400 TABLET ORAL EVERY 6 HOURS
Qty: 56 TABLET | Refills: 0 | Status: SHIPPED | OUTPATIENT
Start: 2024-10-28 | End: 2024-11-04

## 2024-10-28 RX ORDER — DIMENHYDRINATE 50 MG
50 TABLET ORAL EVERY 6 HOURS PRN
Qty: 28 TABLET | Refills: 0 | Status: SHIPPED | OUTPATIENT
Start: 2024-10-28 | End: 2024-11-04

## 2024-10-28 RX ORDER — ACETAMINOPHEN 500 MG
1000 TABLET ORAL EVERY 6 HOURS
Qty: 56 TABLET | Refills: 0 | Status: SHIPPED | OUTPATIENT
Start: 2024-10-28 | End: 2024-11-04

## 2024-10-28 ASSESSMENT — COLUMBIA-SUICIDE SEVERITY RATING SCALE - C-SSRS
6. HAVE YOU EVER DONE ANYTHING, STARTED TO DO ANYTHING, OR PREPARED TO DO ANYTHING TO END YOUR LIFE?: NO
1. IN THE PAST MONTH, HAVE YOU WISHED YOU WERE DEAD OR WISHED YOU COULD GO TO SLEEP AND NOT WAKE UP?: NO
2. HAVE YOU ACTUALLY HAD ANY THOUGHTS OF KILLING YOURSELF IN THE PAST MONTH?: NO

## 2024-10-28 ASSESSMENT — ACTIVITIES OF DAILY LIVING (ADL)
ADLS_ACUITY_SCORE: 0

## 2024-10-28 NOTE — ED TRIAGE NOTES
Woke  up around 3 am with a cramping pain in his right lower abdomen.  Pain is radiating down into his scrotum.  He felt fine with he went to bed.  Last ate popcorn 2130.      Triage Assessment (Adult)       Row Name 10/28/24 0349          Triage Assessment    Airway WDL WDL        Respiratory WDL    Respiratory WDL WDL        Skin Circulation/Temperature WDL    Skin Circulation/Temperature WDL WDL        Cardiac WDL    Cardiac WDL WDL        Peripheral/Neurovascular WDL    Peripheral Neurovascular WDL WDL        Cognitive/Neuro/Behavioral WDL    Cognitive/Neuro/Behavioral WDL WDL

## 2024-10-28 NOTE — ED PROVIDER NOTES
EMERGENCY DEPARTMENT ENCOUNTER      NAME: Marin Jimenez  AGE: 44 year old male  YOB: 1980  MRN: [id number]  EVALUATION DATE & TIME: 10/28/2024  3:44 AM    PCP: GEO Vergara [facility]    ED PROVIDER: Gary Reynoso M.D.      Chief Complaint   Patient presents with    Abdominal Pain         FINAL IMPRESSION:  1. Ureterolithiasis          ED COURSE & MEDICAL DECISION MAKING:    Pertinent Labs & Imaging studies reviewed. (See chart for details)  44 year old male presents to the Emergency Department for evaluation of sudden onset of right flank pain.  Has not had similar in the past.  Ab nontender.  Seems likely kidney stone.  Less likely appendicitis obstruction or other intra-abdominal cause.  Urinalysis shows large amount of red blood cells.  CT scan shows a distal ureteral stone.  Kidney functions normal.  Pain improved here with IV Toradol.  Will discharge home with KSI follow-up.  Return for worsening symptoms    3:53 AM I met with the patient to gather history and to perform my initial exam. I discussed the plan for care while in the Emergency Department.       At the conclusion of the encounter I discussed the results of all of the tests and the disposition. The questions were answered. The patient or family acknowledged understanding and was agreeable with the care plan.     Medical Decision Making  Obtained supplemental history:Supplemental history obtained?: No  Reviewed external records: External records reviewed?: Documented in chart and Outpatient Record: [facility] visit on 07/22/24  Care impacted by chronic illness:Documented in Chart and Other: Gout, Obesity  Did you consider but not order tests?: Work up considered but not performed and documented in chart, if applicable  Did you interpret images independently?: My preliminary independent interpretation of images are right-sided hydronephrosis with ureteral stone.  See radiology notes for  final report.  Consultation discussion with other provider:Did you involve another provider (consultant, , pharmacy, etc.)?: No  Discharge. I prescribed additional prescription strength medication(s) as charted. See documentation for any additional details.    MIPS:              MEDICATIONS GIVEN IN THE EMERGENCY:  Medications   ketorolac (TORADOL) injection 15 mg (15 mg Intravenous Not Given 10/28/24 0413)       NEW PRESCRIPTIONS STARTED AT TODAY'S ER VISIT  New Prescriptions    ACETAMINOPHEN (TYLENOL) 500 MG TABLET    Take 2 tablets (1,000 mg) by mouth every 6 hours for 7 days.    DIMENHYDRINATE (DRAMAMINE) 50 MG TABLET    Take 1 tablet (50 mg) by mouth at bedtime for 7 days.    DIMENHYDRINATE (DRAMAMINE) 50 MG TABLET    Take 1 tablet (50 mg) by mouth every 6 hours as needed for other (kidney stone pain management).    IBUPROFEN (ADVIL/MOTRIN) 200 MG TABLET    Take 2 tablets (400 mg) by mouth every 6 hours for 7 days.          =================================================================    HPI    Patient information was obtained from: Patient     Use of : N/A       Marin Jimenez is a 44 year old male with a pertinent medical history of obesity who presents to this ED for evaluation of abdominal pain.    Patient states that last night he ate a big meal for dinner and then popcorn for a snack after and notes that he then later went to bed with his stomach feeling full. He mentions that he otherwise went to bed at baseline and in his normal state of health, and without any abdominal pain. He reports, however, that he then woke up this morning with constant right lower quadrant abdominal pain. He denies being able to alleviate his abdominal pain with changing positions or using the bathroom. He notes that en route to the ED his abdominal pain radiated to his scrotum. He denies any associated nausea or vomiting. He endorses undergoing a laparoscopic hernia repair in 2004, but he denies any other  "abdominal surgical history. He denies any recent bowel movement issues, stool issues, urinary issues, fevers, or any other complications at this time.    Per Chart Review, patient was seen at [facility] on 07/22/24 for evaluation of 2 days of worth of redness, swelling and pain in the left lower extremity. Patient was diagnosed with cellulitis of left lower extremity with a prescription of Keflex by mouth 4 times daily for 10 days.         PAST MEDICAL HISTORY:  History reviewed. No pertinent past medical history.    PAST SURGICAL HISTORY:  Past Surgical History:   Procedure Laterality Date    INGUINAL HERNIA REPAIR Left 2004       CURRENT MEDICATIONS:    No current facility-administered medications for this encounter.     Current Outpatient Medications   Medication Sig Dispense Refill    acetaminophen (TYLENOL) 500 MG tablet Take 2 tablets (1,000 mg) by mouth every 6 hours for 7 days. 56 tablet 0    dimenhyDRINATE (DRAMAMINE) 50 MG tablet Take 1 tablet (50 mg) by mouth at bedtime for 7 days. 7 tablet 0    dimenhyDRINATE (DRAMAMINE) 50 MG tablet Take 1 tablet (50 mg) by mouth every 6 hours as needed for other (kidney stone pain management). 28 tablet 0    ibuprofen (ADVIL/MOTRIN) 200 MG tablet Take 2 tablets (400 mg) by mouth every 6 hours for 7 days. 56 tablet 0       ALLERGIES:  No Known Allergies    FAMILY HISTORY:  History reviewed. No pertinent family history.    SOCIAL HISTORY:   Social History     Socioeconomic History    Marital status:    Tobacco Use    Smoking status: Never    Smokeless tobacco: Never       VITALS:  BP (!) 148/94   Pulse 66   Temp 97.4  F (36.3  C) (Oral)   Resp 18   Ht 1.956 m (6' 5\")   Wt 127 kg (280 lb)   SpO2 97%   BMI 33.20 kg/m      PHYSICAL EXAM    Physical Exam  Vitals and nursing note reviewed.   Constitutional:       General: He is not in acute distress.     Appearance: He is not diaphoretic.   HENT:      Head: Atraumatic.   Eyes:    "   General: No scleral icterus.     Pupils: Pupils are equal, round, and reactive to light.   Cardiovascular:      Rate and Rhythm: Normal rate and regular rhythm.      Heart sounds: Normal heart sounds.   Pulmonary:      Effort: No respiratory distress.      Breath sounds: Normal breath sounds.   Abdominal:      Palpations: Abdomen is soft.      Tenderness: There is no abdominal tenderness.   Musculoskeletal:         General: No tenderness.   Lymphadenopathy:      Cervical: No cervical adenopathy.   Skin:     General: Skin is warm.      Findings: No rash.           LAB:  All pertinent labs reviewed and interpreted.  Labs Ordered and Resulted from Time of ED Arrival to Time of ED Departure   BASIC METABOLIC PANEL - Abnormal       Result Value    Sodium 140      Potassium 3.6      Chloride 104      Carbon Dioxide (CO2) 23      Anion Gap 13      Urea Nitrogen 16.1      Creatinine 1.17      GFR Estimate 79      Calcium 8.4 (*)     Glucose 125 (*)    ROUTINE UA WITH MICROSCOPIC REFLEX TO CULTURE - Abnormal    Color Urine Yellow      Appearance Urine Clear      Glucose Urine Negative      Bilirubin Urine Negative      Ketones Urine Negative      Specific Gravity Urine 1.024      Blood Urine >1.0 mg/dL (*)     pH Urine 6.0      Protein Albumin Urine 30 (*)     Urobilinogen Urine <2.0      Nitrite Urine Negative      Leukocyte Esterase Urine Negative      Bacteria Urine Few (*)     Mucus Urine Present (*)     RBC Urine 174 (*)     WBC Urine 3     CBC WITH PLATELETS AND DIFFERENTIAL    WBC Count 6.6      RBC Count 5.31      Hemoglobin 15.7      Hematocrit 43.7      MCV 82      MCH 29.6      MCHC 35.9      RDW 12.7      Platelet Count 180      % Neutrophils 50      % Lymphocytes 40      % Monocytes 8      % Eosinophils 1      % Basophils 1      % Immature Granulocytes 0      NRBCs per 100 WBC 0      Absolute Neutrophils 3.3      Absolute Lymphocytes 2.6      Absolute Monocytes 0.5      Absolute Eosinophils 0.1      Absolute  Basophils 0.0      Absolute Immature Granulocytes 0.0      Absolute NRBCs 0.0         RADIOLOGY:  Reviewed all pertinent imaging. Please see official radiology report.  CT Abdomen Pelvis w/o Contrast   Final Result   IMPRESSION:    1. 0.3 cm mid to distal right ureteral calculus, resulting in mild obstruction.   2. No additional renal or ureteral calculi.                            I, Kirby Watson, am serving as a scribe to document services personally performed by Dr. Gary Reynoso, based on my observation and the provider's statements to me. I, Gary Reynoso MD attest that Kirby Watson is acting in a scribe capacity, has observed my performance of the services and has documented them in accordance with my direction.    Gary Reynoso M.D.  Emergency Medicine  [facility] EMERGENCY ROOM  [address] [facility] [address]  [phone]  Dept: [phone]       Gary Reynoso MD  10/28/24 0611

## 2024-10-31 ENCOUNTER — VIRTUAL VISIT (OUTPATIENT)
Dept: UROLOGY | Facility: CLINIC | Age: 44
End: 2024-10-31
Attending: EMERGENCY MEDICINE
Payer: COMMERCIAL

## 2024-10-31 DIAGNOSIS — N20.0 NEPHROLITHIASIS: Primary | ICD-10-CM

## 2024-10-31 PROCEDURE — 99203 OFFICE O/P NEW LOW 30 MIN: CPT | Mod: 95 | Performed by: NURSE PRACTITIONER

## 2024-10-31 ASSESSMENT — PAIN SCALES - GENERAL: PAINLEVEL_OUTOF10: NO PAIN (0)

## 2024-10-31 NOTE — NURSING NOTE
Pt stated that he had pain in the emergency room for about 2 hours, pain left before leaving ED. No pain since then    Current patient location: [address] [place] [address] DR HUBERT ALEXANDER [facility] [id number]    Is the patient currently in the state of [place]? YES    Visit mode:VIDEO    If the visit is dropped, the patient can be reconnected by: VIDEO VISIT: Text to cell phone:   Telephone Information:   Mobile [phone]       Will anyone else be joining the visit? NO  (If patient encounters technical issues they should call [phone])    Are changes needed to the allergy or medication list? No    Are refills needed on medications prescribed by this physician? NO    Rooming Documentation:  Not applicable    Reason for visit: Consult (NEW KIDNEY STONE)    Keerthi VALLECILLO

## 2024-10-31 NOTE — PATIENT INSTRUCTIONS
"UROLOGY CLINIC VISIT PATIENT INSTRUCTIONS      If you have any issues, questions or concerns in the meantime, do not hesitate to contact us at [facility] at [phone] or via [employer].     Olga Vaca CNP  Department of Urology     DIET & LIFESTYLE CHANGES FOR PATIENTS WITH KIDNEY STONES    If you've had a kidney stone, you are likely to form another. Risk of recurrence is 15% at 1 year, 35% to 40% at 2 years, and 50% at 10 years. Therefore, prevention is very important.These recommendations have shown to be effective.    CALCIUM STONES (Oxalate and Phosphate)    Fluid intake is the most important prevention measure to help prevent stones. Fluid intake should be at least 2.5 liters per day or 90-120oz per day. With goal of urine output of >2.5L per day.   Increasing liquids that have citric acid may help such as low calorie orange juice, lemonade (Crystal Light Lemonade or True Lemon/Lime), or adding a citrus to your water.  You can add lemon juice or fresh edmund to your water daily.  Lemon juice increases the citrate in your urine, and helps decrease the formation of stone and even breakdown certain types of stones. Add a cap full/teaspoon of pure lemon juice to each glass.   Try to limit sugar, especially if you have diabetes.    Helpful Fluid Measurements:  1 liter = 34oz  1 quart = 32 oz  24 pack water: Each bottle 16.9 oz     Low Oxalate Diet: Limit your consumption of OXALATE-rich foods including:  All nuts and nut products including peanuts, almonds,peanut butter, almond milk  Spinach  Rhubarb  Beets  Chocolate  Soybeans and soy products     Website:   www.[employer].com  <50mg   Below is a link to a PDF that is based on [employer] research. Please stick to pages 6-9 of this document. My suggestion is to review the list of food that is OK. The \"avoid\" list can be " overwhelming.  [email or web address] [email or web address]_[email or web address]?mc_[email or web address]_eid=[email or web address]    Low Sodium Diet: Salt (sodium chloride) is found in abundance in many foods. High sodium levels in the urine can interfere with the kidney's handling of calcium.   Trying a DASH (Dietary Approaches to Stop Hypertension) diet which is eating more fruits and vegetables, limiting salt intake, moderate in low-fat diary products, and low in animal protein.   Try to decrease salt intake to <2000 mg of sodium daily.     Tips for reducing the salt in your diet:  Don't use salt at the table  Reduce the salt used in food preparation. Try 1/2 teaspoon when recipes call for 1 teaspoon.  Use herbs and spices for flavoring instead of salt.  Avoid salty foods.  Check the label before you buy or use a product. Note sodium and portion size information.  Try to consume less than 2,000 mg/day. (1 teaspoon = 2,000 mg)    Foods with high sodium content include:  Processed meat (including luncheon meats, sausage)   Crackers   Instant cereal   Processed cheese   Canned soups   Chips and snack foods   Soy sauce    Low Animal Protein: Reduce animal protein (meat) intake to no more than 8-10 ounces per day. Increase fruit and vegetables to 5 servings per day.    Maintain a normal calcium diet: Calcium rich foods are encouraged, but no more than 1000 - 1200 mg per day. Researches have found that people with low calcium intakes tend to have more stones. Foods with high calcium content are acceptable and include:  Calcium rich foods include:   Diary (cheese, milk, and yogurt)  Enriched cereals  Meat and fish  Dark green vegetables    Limit Vitamin C intake to < 1000 mg daily.

## 2024-10-31 NOTE — PROGRESS NOTES
Urology Video Office Visit    Video-Visit Details    Type of service:  Video Visit    Video Start Time: 1000    Video End Time: 1026    Originating Location (pt. Location): Home    Distant Location (provider location):  Off-site     Platform used for Video Visit: [employer]           Assessment and Plan:     Assessment: 44 year old male with nephrolithiasis    Plan:  -Reviewed CT scan with patient. Discussed option of repeat imaging to ensure stone passage. Will hold of a this time per patient's preference. If having reoccurring s/s of a kidney stone please notify Urology.   -The patient and I discussed the diagnosis and natural history of urolithiasis Discussed option of 24 hour urine study for further evaluation for risks of stone. Pt amenable to plan of care. Will send litholink kit x 1.   -We discussed some general measures to prevent recurrent kidney stones.  These include fluid intake to achieve 2.5 liters of urine per day, decreased salt intake, normal calcium intake, lowering animal protein intake, avoiding high amounts of oxalate containing foods, and increased citrate intake with orange juice/lemonade.  -RTC in 6-8 weeks.     Olga Vaca CNP  Department of Urology  October 31, 2024    I spent a total of 30 minutes spent on the date of the encounter doing chart review, history and exam, documentation, and further activities as noted above.          Chief Complaint:   Right Ureteral Stone         History of Present Illness:    Marin Jimenez is a pleasant 44 year old male who presents with concerns of a right ureteral stone.    Mr. Jimenez was seen in the ER on 10/28/24 for concerns of right flank pain.     CT scan on 10/28/24 (images personally reviewed) revealed a right 3mm distal ureteral stone with mild HUN. No noted right renal stones. No noted left hydronephrosis or nephrolithiasis.     Hasn't had any pain since ER visit. Has not seen stone pass at this time. Denies any flank pain, fevers, chills,  nausea, vomiting, hematuria, or dysuria.     This was his first stone episode. Family history of stones in grandfather     Stone Risk Factors: gout (last attack about 1 year),          Past Medical History:   No past medical history on file.         Past Surgical History:     Past Surgical History:   Procedure Laterality Date    INGUINAL HERNIA REPAIR Left 2004            Medications     Current Outpatient Medications   Medication Sig Dispense Refill    acetaminophen (TYLENOL) 500 MG tablet Take 2 tablets (1,000 mg) by mouth every 6 hours for 7 days. 56 tablet 0    dimenhyDRINATE (DRAMAMINE) 50 MG tablet Take 1 tablet (50 mg) by mouth at bedtime for 7 days. 7 tablet 0    dimenhyDRINATE (DRAMAMINE) 50 MG tablet Take 1 tablet (50 mg) by mouth every 6 hours as needed for other (kidney stone pain management). 28 tablet 0    ibuprofen (ADVIL/MOTRIN) 200 MG tablet Take 2 tablets (400 mg) by mouth every 6 hours for 7 days. 56 tablet 0     No current facility-administered medications for this visit.            Family History:   No family history on file.         Social History:     Social History     Socioeconomic History    Marital status:      Spouse name: Not on file    Number of children: Not on file    Years of education: Not on file    Highest education level: Not on file   Occupational History    Not on file   Tobacco Use    Smoking status: Never    Smokeless tobacco: Never   Substance and Sexual Activity    Alcohol use: Yes     Comment: Social    Drug use: Never    Sexual activity: Not on file   Other Topics Concern    Not on file   Social History Narrative    Not on file     Social Drivers of Health     Financial Resource Strain: Not on file   Food Insecurity: Not on file   Transportation Needs: Not on file   Physical Activity: Not on file   Stress: Not on file   Social Connections: Not on file   Interpersonal Safety: Not on file   Housing Stability: Not on file            Allergies:   Patient has no known  allergies.         Review of Systems:  From intake questionnaire   Negative 14 system review except as noted on HPI, nurse's note.         Physical Exam:   General Appearance: Well groomed, hygenic  Eyes: No redness, discharge  Respiratory: No cough, no respiratory distress or labored breathing  Musculoskeletal: Grossly normal, full range of motion in upper extremities, no gross deficits  Skin: No discoloration or apparent rashes  Neurologic - No tremors  Psychiatric - Alert and oriented  The rest of a comprehensive physical examination is deferred due to video visit restrictions        Labs:    I personally reviewed all applicable laboratory data and went over findings with patient  Significant for:    CBC RESULTS:  Recent Labs   Lab Test 10/28/24  0408   WBC 6.6   HGB 15.7           BMP RESULTS:  Recent Labs   Lab Test 10/28/24  0408 06/11/19  1052     --    POTASSIUM 3.6  --    CHLORIDE 104  --    CO2 23  --    ANIONGAP 13  --    * 89   BUN 16.1  --    CR 1.17  --    GFRESTIMATED 79  --    YOSHI 8.4*  --        UA RESULTS:   Recent Labs   Lab Test 10/28/24  0548   SG 1.024   URINEPH 6.0   NITRITE Negative   RBCU 174*   WBCU 3       CALCIUM RESULTS  Lab Results   Component Value Date    YOSHI 8.4 10/28/2024           Imaging:    I personally reviewed all applicable imaging and went over the below findings with patient.    Results for orders placed or performed during the hospital encounter of 10/28/24   CT Abdomen Pelvis w/o Contrast    Narrative    EXAM: CT ABDOMEN AND PELVIS WITHOUT CONTRAST - RENAL STONE PROTOCOL  LOCATION: [facility]  DATE/TIME: 10/28/2024 4:38 AM CDT    INDICATION: Right flank pain.  COMPARISON: None.    TECHNIQUE: CT scan of the abdomen and pelvis was performed without oral or IV contrast. Multiplanar reformats were obtained. Dose reduction techniques were used.  CONTRAST: None.    FINDINGS:    LOWER CHEST: Unremarkable.    HEPATOBILIARY:  Unremarkable.    SPLEEN: Unremarkable.    PANCREAS: Unremarkable.    ADRENAL GLANDS: Unremarkable.    KIDNEYS/BLADDER: 0.3 cm mid to distal right ureteral calculus. Mild dilatation of the more proximal right ureter and intrarenal collecting system. Slight right perinephric haziness. No additional renal or ureteral calculi. No visualized bladder calculi.    BOWEL: A few colonic diverticula are present without evidence of diverticulitis. Normal appendix.    LYMPH NODES: Unremarkable.    PELVIC ORGANS: No acute findings.    MUSCULOSKELETAL: No acute findings.    OTHER: Small paraumbilical hernia containing fat.      Impression    IMPRESSION:   1. 0.3 cm mid to distal right ureteral calculus, resulting in mild obstruction.  2. No additional renal or ureteral calculi.

## 2024-11-01 ENCOUNTER — TELEPHONE (OUTPATIENT)
Dept: UROLOGY | Facility: CLINIC | Age: 44
End: 2024-11-01
Payer: COMMERCIAL

## 2024-11-01 NOTE — TELEPHONE ENCOUNTER
Sent order for 24 hour urine kit x 1 to [facility] via [employer]. [place] message sent to patient with instructions for completion.

## 2024-12-11 ENCOUNTER — TELEPHONE (OUTPATIENT)
Dept: UROLOGY | Facility: CLINIC | Age: 44
End: 2024-12-11
Payer: COMMERCIAL

## 2024-12-17 ENCOUNTER — TELEPHONE (OUTPATIENT)
Dept: UROLOGY | Facility: CLINIC | Age: 44
End: 2024-12-17

## 2024-12-17 ENCOUNTER — VIRTUAL VISIT (OUTPATIENT)
Dept: UROLOGY | Facility: CLINIC | Age: 44
End: 2024-12-17
Payer: COMMERCIAL

## 2024-12-17 DIAGNOSIS — N20.0 NEPHROLITHIASIS: Primary | ICD-10-CM

## 2024-12-17 PROCEDURE — 99214 OFFICE O/P EST MOD 30 MIN: CPT | Mod: 95 | Performed by: NURSE PRACTITIONER

## 2024-12-17 ASSESSMENT — PAIN SCALES - GENERAL: PAINLEVEL_OUTOF10: NO PAIN (0)

## 2024-12-17 NOTE — TELEPHONE ENCOUNTER
Sent order for 24 hour urine kit x 1 to [place] via [employer]. [employer] message sent to patient with instructions for completion.

## 2024-12-17 NOTE — PATIENT INSTRUCTIONS
UROLOGY CLINIC VISIT PATIENT INSTRUCTIONS    Increase fluid intake to 90-120oz per day  Cut out peanuts/cashews. Try walnuts, pistachios, seeds for alternatives  Add citric acid into diet. Increase fruit/veg, orange juice, lemonade, Crystal Light Lemonade, or True Lemon/Lime  Cut back on animal protein. Try to intake 8-10oz per day.   Watch the sodium in diet. Try to aim for 2500mg per day    If you have any issues, questions or concerns in the meantime, do not hesitate to contact us at [facility] at [phone] or via [place] [employer] [place].     Olga Vaca, CNP  Department of Urology       DIET & LIFESTYLE CHANGES FOR PATIENTS WITH KIDNEY STONES    If you've had a kidney stone, you are likely to form another. Risk of recurrence is 15% at 1 year, 35% to 40% at 2 years, and 50% at 10 years. Therefore, prevention is very important.  Because of the chemical analysis of both your stone & urine, some changes in your diet & lifestyle are recommended. These recommendations have shown to be effective.    CALCIUM STONES (Oxalate and Phosphate)    Fluid intake is the most important prevention measure to help prevent stones. Fluid intake should be at least 2.5 liters per day or 90-120oz per day. With goal of urine output of >2.5L per day.   Increasing liquids that have citric acid may help such as low calorie orange juice, lemonade (Crystal Light Lemonade or True Lemon/Lime), or adding a citrus to your water.  You can add lemon juice or fresh edmund to your water daily.  Lemon juice increases the citrate in your urine, and helps decrease the formation of stone and even breakdown certain types of stones. Add a cap full/teaspoon of pure lemon juice to each glass.   Try to limit sugar, especially if you have diabetes.    Helpful Fluid Measurements:  1 liter = 34oz  1 quart = 32 oz  24 pack water: Each bottle 16.9 oz     Low Oxalate Diet: Limit your consumption of OXALATE-rich foods including:  All nuts and nut  "products including peanuts, almonds,peanut butter, almond milk  Spinach  Rhubarb  Beets  Chocolate  Soybeans and soy products     Website:   www.[email or web address] NutritionedTag'By.Pumodo    Below is a link to a PDF that is based on [employer] research. Please stick to pages 6-9 of this document. My suggestion is to review the list of food that is OK. The \"avoid\" list can be overwhelming.  [email or web address]_Food_Lists_[email or web address]?mc_[email or web address]&mc_eid=[email or web address]    Low Sodium Diet: Salt (sodium chloride) is found in abundance in many foods. High sodium levels in the urine can interfere with the kidney's handling of calcium.   Trying a DASH (Dietary Approaches to Stop Hypertension) diet which is eating more fruits and vegetables, limiting salt intake, moderate in low-fat diary products, and low in animal protein.   Try to decrease salt intake to <2000 mg of sodium daily.     Tips for reducing the salt in your diet:  Don't use salt at the table  Reduce the salt used in food preparation. Try 1/2 teaspoon when recipes call for 1 teaspoon.  Use herbs and spices for flavoring instead of salt.  Avoid salty foods.  Check the label before you buy or use a product. Note sodium and portion size information.  Try to consume less than 2,000 mg/day. (1 teaspoon = 2,000 mg)    Foods with high sodium content include:  Processed meat (including luncheon meats, sausage)   Crackers   Instant cereal   Processed cheese   Canned soups   Chips and snack foods   Soy sauce    Low Animal Protein: Reduce animal protein (meat) intake to no more than 8-10 ounces per day. Increase fruit and vegetables to 5 servings per day.    Maintain a normal calcium diet: Calcium rich foods are encouraged, but no more than 1000 - 1200 mg per day. Researches have found that people with low calcium intakes tend to have more stones. Foods with high calcium content are acceptable and include:  Calcium " rich foods include:   Diary (cheese, milk, and yogurt)  Enriched cereals  Meat and fish  Dark green vegetables    Limit Vitamin C intake to < 1000 mg daily.      Uric Acid Stones  All of the above recommendations may be helpful  Reducing your intake of animal protein is also shown to be helpful.  Avoid high- and moderate- purine containing foods  High: organ meats, anchovies, sardines, mackerel, and water fowl.   Moderate: shellfish, fish, game meats, beef, pork, and meat based soups/broths  You may be asked to take medication to make your urine more ALKALINE; this will help to dissolve your stones & prevent more from forming.  Medications can be used to reduce serum urate levels and/or decrease the risk for the incidence gout and the avoidance of medications/additives promoting hyperuricemia.   There is a correlation between uric acid stones and Gout. The lifestyle interventions, largely overlapping those used for the prevention of recurrent gout, include adjustment of dietary volume and composition, avoidance of alcohol, reduction to ideal body weight, and regular exercise.

## 2024-12-17 NOTE — PROGRESS NOTES
Urology Video Office Visit    Video-Visit Details    Type of service:  Video Visit    Video Start Time: 0833    Video End Time: 0851    Originating Location (pt. Location): Home    Distant Location (provider location):  Off-site     Platform used for Video Visit: Anke           Assessment and Plan:     Assessment:44 year old male with history of nephrolithiasis    Plan:  -Reviewed 24 hour urine study with patient. Noted low urinary volume, hyperoxaluria, hypocitraturia, low urinary pH, hyperuricosuria, and high animal protein intake. Recommend to increase fluid intake to 90-120oz; review diet for foods high in oxalate (recommend to limit cashews/peanuts); maintain normal calcium intake, add citric acid as able to fluid such as edmund/lime or Crystal Light Lemonade, and decrease animal protein consumption.   -Recommend repeat 24 hour urine study after dietary intervention. Pt amenable to plan. Will send litholink kit x 1.   -RTC in 6-8 weeks     Olga Vaca CNP  Department of Urology  December 17, 2024    I spent a total of 25 minutes spent on the date of the encounter doing chart review, history and exam, documentation, and further activities as noted above.          Chief Complaint:   Nephrolithiasis         History of Present Illness:    Marin Jimenez is a pleasant 44 year old male who presents for follow up stone prevention.     Mr. Jimenez was last seen on 10/31/24. He is doing well since his last visit. Denies any s/s of kidney stones.     He was seen in Oct 2024 with a right ureter stone which he was able to pass spontaneously. He was unable to retrieve his stone.    CT scan on 10/28/24 (images personally reviewed) revealed a right 3mm distal ureteral stone with mild HUN. No noted right renal stones. No noted left hydronephrosis or nephrolithiasis.       This was his first stone episode. Family history of stones in grandfather.      Stone Risk Factors: gout (last attack about 1 year),    24 hour  urine study revealed low urinary volume, hyperoxaluria, hypocitraturia, low urinary pH, hyperuricosuria, and high animal protein intake.     Fluid intake is about 64oz of water per day along with other fluids such as milk. He does like to eat peanuts and cashews. Doesn't consume a lot of red meat but will eat chicken/eggs.              Past Medical History:   No past medical history on file.         Past Surgical History:     Past Surgical History:   Procedure Laterality Date    INGUINAL HERNIA REPAIR Left 2004            Medications     No current outpatient medications on file.     No current facility-administered medications for this visit.            Family History:   No family history on file.         Social History:     Social History     Socioeconomic History    Marital status:      Spouse name: Not on file    Number of children: Not on file    Years of education: Not on file    Highest education level: Not on file   Occupational History    Not on file   Tobacco Use    Smoking status: Never    Smokeless tobacco: Never   Substance and Sexual Activity    Alcohol use: Yes     Comment: Social    Drug use: Never    Sexual activity: Not on file   Other Topics Concern    Not on file   Social History Narrative    Not on file     Social Drivers of Health     Financial Resource Strain: Not on file   Food Insecurity: Not on file   Transportation Needs: Not on file   Physical Activity: Not on file   Stress: Not on file   Social Connections: Not on file   Interpersonal Safety: Not on file   Housing Stability: Not on file            Allergies:   Patient has no known allergies.         Review of Systems:  From intake questionnaire   Negative 14 system review except as noted on HPI, nurse's note.         Physical Exam:   General Appearance: Well groomed, hygenic  Eyes: No redness, discharge  Respiratory: No cough, no respiratory distress or labored breathing  Musculoskeletal: Grossly normal, full range of motion in  upper extremities, no gross deficits  Skin: No discoloration or apparent rashes  Neurologic - No tremors  Psychiatric - Alert and oriented  The rest of a comprehensive physical examination is deferred due to video visit restrictions        Labs:    I personally reviewed all applicable laboratory data and went over findings with patient  Significant for:    CBC RESULTS:  Recent Labs   Lab Test 10/28/24  0408   WBC 6.6   HGB 15.7           BMP RESULTS:  Recent Labs   Lab Test 10/28/24  0408 06/11/19  1052     --    POTASSIUM 3.6  --    CHLORIDE 104  --    CO2 23  --    ANIONGAP 13  --    * 89   BUN 16.1  --    CR 1.17  --    GFRESTIMATED 79  --    YOSHI 8.4*  --        UA RESULTS:   Recent Labs   Lab Test 10/28/24  0548   SG 1.024   URINEPH 6.0   NITRITE Negative   RBCU 174*   WBCU 3       CALCIUM RESULTS  Lab Results   Component Value Date    YOSHI 8.4 10/28/2024           Imaging:    I personally reviewed all applicable imaging and went over the below findings with patient.    Results for orders placed or performed during the hospital encounter of 10/28/24   CT Abdomen Pelvis w/o Contrast    Narrative    EXAM: CT ABDOMEN AND PELVIS WITHOUT CONTRAST - RENAL STONE PROTOCOL  LOCATION: [facility]  DATE/TIME: 10/28/2024 4:38 AM CDT    INDICATION: Right flank pain.  COMPARISON: None.    TECHNIQUE: CT scan of the abdomen and pelvis was performed without oral or IV contrast. Multiplanar reformats were obtained. Dose reduction techniques were used.  CONTRAST: None.    FINDINGS:    LOWER CHEST: Unremarkable.    HEPATOBILIARY: Unremarkable.    SPLEEN: Unremarkable.    PANCREAS: Unremarkable.    ADRENAL GLANDS: Unremarkable.    KIDNEYS/BLADDER: 0.3 cm mid to distal right ureteral calculus. Mild dilatation of the more proximal right ureter and intrarenal collecting system. Slight right perinephric haziness. No additional renal or ureteral calculi. No visualized bladder calculi.    BOWEL:  A few colonic diverticula are present without evidence of diverticulitis. Normal appendix.    LYMPH NODES: Unremarkable.    PELVIC ORGANS: No acute findings.    MUSCULOSKELETAL: No acute findings.    OTHER: Small paraumbilical hernia containing fat.      Impression    IMPRESSION:   1. 0.3 cm mid to distal right ureteral calculus, resulting in mild obstruction.  2. No additional renal or ureteral calculi.

## 2024-12-17 NOTE — NURSING NOTE
Current patient location: [address] [place] [address] DR [address]UBERT A[address] [facility] [address]    Is the patient currently in the state of [place]? YES    Visit mode:VIDEO    If the visit is dropped, the patient can be reconnected by:VIDEO VISIT: Text to cell phone:   Telephone Information:   Mobile [phone]       Will anyone else be joining the visit? NO  (If patient encounters technical issues they should call [phone]    Are changes needed to the allergy or medication list? No    Are refills needed on medications prescribed by this physician? NO    Rooming Documentation:  Not applicable    Reason for visit: RECHECK (GO OVER [facility])    Keerthi KRAFTF

## 2025-02-25 ENCOUNTER — TELEPHONE (OUTPATIENT)
Dept: UROLOGY | Facility: CLINIC | Age: 45
End: 2025-02-25
Payer: COMMERCIAL

## 2025-02-28 ENCOUNTER — TRANSFERRED RECORDS (OUTPATIENT)
Dept: HEALTH INFORMATION MANAGEMENT | Facility: CLINIC | Age: 45
End: 2025-02-28
Payer: COMMERCIAL

## 2025-03-20 ENCOUNTER — VIRTUAL VISIT (OUTPATIENT)
Dept: UROLOGY | Facility: CLINIC | Age: 45
End: 2025-03-20
Payer: COMMERCIAL

## 2025-03-20 VITALS — WEIGHT: 272 LBS | BODY MASS INDEX: 32.12 KG/M2 | HEIGHT: 77 IN

## 2025-03-20 DIAGNOSIS — R82.991 HYPOCITRATURIA: Primary | ICD-10-CM

## 2025-03-20 DIAGNOSIS — R34 LOW URINE OUTPUT: ICD-10-CM

## 2025-03-20 DIAGNOSIS — R82.994 HYPERCALCIURIA: ICD-10-CM

## 2025-03-20 DIAGNOSIS — N20.0 NEPHROLITHIASIS: ICD-10-CM

## 2025-03-20 ASSESSMENT — PAIN SCALES - GENERAL: PAINLEVEL_OUTOF10: NO PAIN (0)

## 2025-03-20 NOTE — PROGRESS NOTES
Urology Video Office Visit    Video-Visit Details    Type of service:  Video Visit    Video Start Time: 1138    Video End Time: 1149    Originating Location (pt. Location): Home    Distant Location (provider location):  Off-site     Platform used for Video Visit: Maricruz           Assessment and Plan:     Assessment:44 year old male with nephrolithiasis    Plan:  -Reviewed 24 hour urine study. Noted low fluid intake, borderline hypercalcuria, hypocitraturia, high urinary sodium, and high animal protein intake.   -Recommend to increase fluid intake to 90-120oz per day. Discussed continue with low sodium and low animal protein diet. Recommend to add citric acid into diet such as OJ, Lemonade, lemon juice, or Crystal Light Lemonade.   -Discussed option of repeat 24 hour urine study. He would like to hold off at this time.   -RTC in 1 year with CT or sooner YUNG Vaca CNP  Department of Urology  March 20, 2025    I spent a total of 20 minutes spent on the date of the encounter doing chart review, history and exam, documentation, and further activities as noted above.          Chief Complaint:   Stone Prevention         History of Present Illness:    Marin Jimenez is a pleasant 44 year old male who presents for follow up stone prevention.     Mr. Jimenez was last seen in Dec 2024. He has been doing well since he was last seen. Denies any s/s of kidney stones.    Last stone episode was in Oct 2024 which he was able to pass on own.      CT scan on 10/28/24 (images personally reviewed) revealed a right 3mm distal ureteral stone with mild HUN. No noted right renal stones. No noted left hydronephrosis or nephrolithiasis.       This was his first stone episode. Family history of stones in grandfather.      Stone Risk Factors: gout (last attack about 1 year),      Fluid intake is about 64oz of water per day along with other fluids such as milk. He does like to eat peanuts and cashews. Doesn't consume a lot of red  Telephone Encounter by Edin Silva Edin Silva RN, MSN Registered Nurse Maryagnes Lesch meat but will eat chicken/eggs.     He has lost about 25lbs since his last visit. Has been doing intermittent fasting to help with weight loss.     24 hour urine study revealed low fluid intake, borderline hypercalcuria, hypocitraturia, high urinary sodium, and high animal protein intake.            Past Medical History:   No past medical history on file.         Past Surgical History:     Past Surgical History:   Procedure Laterality Date    INGUINAL HERNIA REPAIR Left 2004            Medications     No current outpatient medications on file.     No current facility-administered medications for this visit.            Family History:   No family history on file.         Social History:     Social History     Socioeconomic History    Marital status:      Spouse name: Not on file    Number of children: Not on file    Years of education: Not on file    Highest education level: Not on file   Occupational History    Not on file   Tobacco Use    Smoking status: Never    Smokeless tobacco: Never   Substance and Sexual Activity    Alcohol use: Yes     Comment: Social    Drug use: Never    Sexual activity: Not on file   Other Topics Concern    Not on file   Social History Narrative    Not on file     Social Drivers of Health     Financial Resource Strain: Not on file   Food Insecurity: Not on file   Transportation Needs: Not on file   Physical Activity: Not on file   Stress: Not on file   Social Connections: Not on file   Interpersonal Safety: Not on file   Housing Stability: Not on file            Allergies:   Patient has no known allergies.         Review of Systems:  From intake questionnaire   Negative 14 system review except as noted on HPI, nurse's note.         Physical Exam:   General Appearance: Well groomed, hygenic  Eyes: No redness, discharge  Respiratory: No cough, no respiratory distress or labored breathing  Musculoskeletal: Grossly normal, full range of motion in upper extremities, no gross  deficits  Skin: No discoloration or apparent rashes  Neurologic - No tremors  Psychiatric - Alert and oriented  The rest of a comprehensive physical examination is deferred due to video visit restrictions        Labs:    I personally reviewed all applicable laboratory data and went over findings with patient  Significant for:    CBC RESULTS:  Recent Labs   Lab Test 10/28/24  0408   WBC 6.6   HGB 15.7           BMP RESULTS:  Recent Labs   Lab Test 10/28/24  0408 06/11/19  1052     --    POTASSIUM 3.6  --    CHLORIDE 104  --    CO2 23  --    ANIONGAP 13  --    * 89   BUN 16.1  --    CR 1.17  --    GFRESTIMATED 79  --    YOSHI 8.4*  --        UA RESULTS:   Recent Labs   Lab Test 10/28/24  0548   SG 1.024   URINEPH 6.0   NITRITE Negative   RBCU 174*   WBCU 3       CALCIUM RESULTS  Lab Results   Component Value Date    YOSHI 8.4 10/28/2024           Imaging:    I personally reviewed all applicable imaging and went over the below findings with patient.    Results for orders placed or performed during the hospital encounter of 10/28/24   CT Abdomen Pelvis w/o Contrast    Narrative    EXAM: CT ABDOMEN AND PELVIS WITHOUT CONTRAST - RENAL STONE PROTOCOL  LOCATION: [facility]  DATE/TIME: 10/28/2024 4:38 AM CDT    INDICATION: Right flank pain.  COMPARISON: None.    TECHNIQUE: CT scan of the abdomen and pelvis was performed without oral or IV contrast. Multiplanar reformats were obtained. Dose reduction techniques were used.  CONTRAST: None.    FINDINGS:    LOWER CHEST: Unremarkable.    HEPATOBILIARY: Unremarkable.    SPLEEN: Unremarkable.    PANCREAS: Unremarkable.    ADRENAL GLANDS: Unremarkable.    KIDNEYS/BLADDER: 0.3 cm mid to distal right ureteral calculus. Mild dilatation of the more proximal right ureter and intrarenal collecting system. Slight right perinephric haziness. No additional renal or ureteral calculi. No visualized bladder calculi.    BOWEL: A few colonic diverticula  are present without evidence of diverticulitis. Normal appendix.    LYMPH NODES: Unremarkable.    PELVIC ORGANS: No acute findings.    MUSCULOSKELETAL: No acute findings.    OTHER: Small paraumbilical hernia containing fat.      Impression    IMPRESSION:   1. 0.3 cm mid to distal right ureteral calculus, resulting in mild obstruction.  2. No additional renal or ureteral calculi.

## 2025-03-20 NOTE — PATIENT INSTRUCTIONS
UROLOGY CLINIC VISIT PATIENT INSTRUCTIONS    It was a pleasure seeing you today! Thank you for giving us the opportunity to care for you. We hope we provided the excellent service you deserve and look forward to serving you again.    Instructions per today's visit:   -Return to clinic in 1 year with CT    If you have any issues, questions, or concerns, please don't hesitate to contact us at [facility] at [phone] or via [employer].    Important Contact Information:  -To each our nurse triage line, please call our contact center at [phone].  -Our clinic hours are Monday through Friday, 8:00 a.m. - 4:30 p.m. Feel free to call during these hours with any questions.  -You can also contact us anytime via [employer], and we will respond during clinic hours.      Olga Vaca CNP  Department of Urology     DIET & LIFESTYLE CHANGES FOR PATIENTS WITH KIDNEY STONES    If you've had a kidney stone, you are likely to form another. Risk of recurrence is 15% at 1 year, 35% to 40% at 2 years, and 50% at 10 years. Therefore, prevention is very important.   These recommendations have shown to be effective.    CALCIUM STONES (Oxalate and Phosphate)    Fluid intake is the most important prevention measure to help prevent stones. Fluid intake should be at least 2.5 liters per day or 90-120oz per day. With goal of urine output of >2.5L per day.   Increasing liquids that have citric acid may help such as low calorie orange juice, lemonade (Crystal Light Lemonade or True Lemon/Lime), or adding a citrus to your water.  You can add lemon juice or fresh edmund to your water daily.  Lemon juice increases the citrate in your urine, and helps decrease the formation of stone and even breakdown certain types of stones. Add a cap full/teaspoon of pure lemon juice to each glass.   Try to limit sugar, especially if you have diabetes.    Helpful Fluid Measurements:  1 liter = 34oz  1 quart = 32 oz  24 pack water: Each  "bottle 16.9 oz     Low Oxalate Diet: Limit your consumption of OXALATE-rich foods including:  All nuts and nut products including peanuts, almonds,peanut butter, almond milk  Spinach  Rhubarb  Beets  Chocolate  Soybeans and soy products     Website:   www.[email or web address] [employer]    Below is a link to a PDF that is based on [employer] research. Please stick to pages 6-9 of this document. My suggestion is to review the list of food that is OK. The \"avoid\" list can be overwhelming.  https://[employer]/[email or web address]/files/[email or web address]/Oxalate_Food_Lists_KSD.pdf?mc_cid=[id number]&mc_eid=[id number]    Low Sodium Diet: Salt (sodium chloride) is found in abundance in many foods. High sodium levels in the urine can interfere with the kidney's handling of calcium.   Trying a DASH (Dietary Approaches to Stop Hypertension) diet which is eating more fruits and vegetables, limiting salt intake, moderate in low-fat diary products, and low in animal protein.   Try to decrease salt intake to <2000 mg of sodium daily.     Tips for reducing the salt in your diet:  Don't use salt at the table  Reduce the salt used in food preparation. Try 1/2 teaspoon when recipes call for 1 teaspoon.  Use herbs and spices for flavoring instead of salt.  Avoid salty foods.  Check the label before you buy or use a product. Note sodium and portion size information.  Try to consume less than 2,000 mg/day. (1 teaspoon = 2,000 mg)    Foods with high sodium content include:  Processed meat (including luncheon meats, sausage)   Crackers   Instant cereal   Processed cheese   Canned soups   Chips and snack foods   Soy sauce    Low Animal Protein: Reduce animal protein (meat) intake to no more than 8-10 ounces per day. Increase fruit and vegetables to 5 servings per day.    Maintain a normal calcium diet: Calcium rich foods are encouraged, but no more than 1000 - 1200 mg per day. Researches have found that people with low " calcium intakes tend to have more stones. Foods with high calcium content are acceptable and include:  Calcium rich foods include:   Diary (cheese, milk, and yogurt)  Enriched cereals  Meat and fish  Dark green vegetables    Limit Vitamin C intake to < 1000 mg daily.

## 2025-03-20 NOTE — NURSING NOTE
Current patient location: [address] [place] [address] [address] A[address] [facility] [address]    Is the patient currently in the state of [place]? YES    Visit mode: VIDEO    If the visit is dropped, the patient can be reconnected by:VIDEO VISIT: Text to cell phone:   Telephone Information:   Mobile [phone]       Will anyone else be joining the visit? NO  (If patient encounters technical issues they should call [phone])    Are changes needed to the allergy or medication list? No    Are refills needed on medications prescribed by this physician? NO    Rooming Documentation:  Questionnaire(s) completed    Reason for visit: RECHECK    Amanda KRAFTF

## 2025-07-31 ENCOUNTER — PATIENT OUTREACH (OUTPATIENT)
Dept: CARE COORDINATION | Facility: CLINIC | Age: 45
End: 2025-07-31
Payer: COMMERCIAL